# Patient Record
Sex: FEMALE | Race: WHITE
[De-identification: names, ages, dates, MRNs, and addresses within clinical notes are randomized per-mention and may not be internally consistent; named-entity substitution may affect disease eponyms.]

---

## 2021-01-29 ENCOUNTER — HOSPITAL ENCOUNTER (OUTPATIENT)
Dept: HOSPITAL 53 - M LDO | Age: 26
LOS: 1 days | Discharge: HOME | End: 2021-01-30
Attending: OBSTETRICS & GYNECOLOGY
Payer: COMMERCIAL

## 2021-01-29 VITALS — HEIGHT: 63 IN | WEIGHT: 184.97 LBS | BODY MASS INDEX: 32.77 KG/M2

## 2021-01-29 VITALS — DIASTOLIC BLOOD PRESSURE: 69 MMHG | SYSTOLIC BLOOD PRESSURE: 119 MMHG
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
## 2021-01-29 DIAGNOSIS — Z3A.32: ICD-10-CM

## 2021-01-29 DIAGNOSIS — O99.891: Primary | ICD-10-CM

## 2021-01-29 PROCEDURE — 85027 COMPLETE CBC AUTOMATED: CPT

## 2021-01-29 PROCEDURE — 85460 HEMOGLOBIN FETAL: CPT

## 2021-01-29 PROCEDURE — 59025 FETAL NON-STRESS TEST: CPT

## 2021-01-29 PROCEDURE — 36415 COLL VENOUS BLD VENIPUNCTURE: CPT

## 2021-01-30 LAB
HCT VFR BLD AUTO: 31.7 % (ref 36–47)
HGB BLD-MCNC: 10 G/DL (ref 12–15.5)
MCH RBC QN AUTO: 28.5 PG (ref 27–33)
MCHC RBC AUTO-ENTMCNC: 31.5 G/DL (ref 32–36.5)
MCV RBC AUTO: 90.3 FL (ref 80–96)
PLATELET # BLD AUTO: 202 10^3/UL (ref 150–450)
RBC # BLD AUTO: 3.51 10^6/UL (ref 4–5.4)
WBC # BLD AUTO: 10 10^3/UL (ref 4–10)
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
## 2021-03-19 ENCOUNTER — HOSPITAL ENCOUNTER (INPATIENT)
Dept: HOSPITAL 53 - M LDO | Age: 26
LOS: 2 days | Discharge: HOME | End: 2021-03-21
Attending: OBSTETRICS & GYNECOLOGY | Admitting: OBSTETRICS & GYNECOLOGY
Payer: COMMERCIAL

## 2021-03-19 VITALS — SYSTOLIC BLOOD PRESSURE: 134 MMHG | DIASTOLIC BLOOD PRESSURE: 62 MMHG

## 2021-03-19 VITALS — DIASTOLIC BLOOD PRESSURE: 63 MMHG | SYSTOLIC BLOOD PRESSURE: 138 MMHG

## 2021-03-19 VITALS — DIASTOLIC BLOOD PRESSURE: 56 MMHG | SYSTOLIC BLOOD PRESSURE: 120 MMHG

## 2021-03-19 VITALS — SYSTOLIC BLOOD PRESSURE: 128 MMHG | DIASTOLIC BLOOD PRESSURE: 58 MMHG

## 2021-03-19 VITALS — DIASTOLIC BLOOD PRESSURE: 57 MMHG | SYSTOLIC BLOOD PRESSURE: 129 MMHG

## 2021-03-19 VITALS — DIASTOLIC BLOOD PRESSURE: 54 MMHG | SYSTOLIC BLOOD PRESSURE: 110 MMHG

## 2021-03-19 VITALS — DIASTOLIC BLOOD PRESSURE: 63 MMHG | SYSTOLIC BLOOD PRESSURE: 139 MMHG

## 2021-03-19 VITALS — SYSTOLIC BLOOD PRESSURE: 123 MMHG | DIASTOLIC BLOOD PRESSURE: 55 MMHG

## 2021-03-19 VITALS — DIASTOLIC BLOOD PRESSURE: 60 MMHG | SYSTOLIC BLOOD PRESSURE: 112 MMHG

## 2021-03-19 VITALS — SYSTOLIC BLOOD PRESSURE: 106 MMHG | DIASTOLIC BLOOD PRESSURE: 54 MMHG

## 2021-03-19 VITALS — DIASTOLIC BLOOD PRESSURE: 87 MMHG | SYSTOLIC BLOOD PRESSURE: 118 MMHG

## 2021-03-19 VITALS — SYSTOLIC BLOOD PRESSURE: 116 MMHG | DIASTOLIC BLOOD PRESSURE: 67 MMHG

## 2021-03-19 VITALS — SYSTOLIC BLOOD PRESSURE: 132 MMHG | DIASTOLIC BLOOD PRESSURE: 62 MMHG

## 2021-03-19 VITALS — BODY MASS INDEX: 34.8 KG/M2 | HEIGHT: 63 IN | WEIGHT: 196.43 LBS

## 2021-03-19 VITALS — DIASTOLIC BLOOD PRESSURE: 59 MMHG | SYSTOLIC BLOOD PRESSURE: 129 MMHG

## 2021-03-19 VITALS — SYSTOLIC BLOOD PRESSURE: 130 MMHG | DIASTOLIC BLOOD PRESSURE: 60 MMHG

## 2021-03-19 VITALS — DIASTOLIC BLOOD PRESSURE: 65 MMHG | SYSTOLIC BLOOD PRESSURE: 133 MMHG

## 2021-03-19 VITALS — SYSTOLIC BLOOD PRESSURE: 114 MMHG | DIASTOLIC BLOOD PRESSURE: 51 MMHG

## 2021-03-19 VITALS — DIASTOLIC BLOOD PRESSURE: 79 MMHG | SYSTOLIC BLOOD PRESSURE: 130 MMHG

## 2021-03-19 VITALS — SYSTOLIC BLOOD PRESSURE: 136 MMHG | DIASTOLIC BLOOD PRESSURE: 63 MMHG

## 2021-03-19 VITALS — SYSTOLIC BLOOD PRESSURE: 131 MMHG | DIASTOLIC BLOOD PRESSURE: 77 MMHG

## 2021-03-19 DIAGNOSIS — Z3A.39: ICD-10-CM

## 2021-03-19 LAB
BASE EXCESS BLDCOA CALC-SCNC: -4.7 MMOL/L
BASE EXCESS BLDCOV CALC-SCNC: -5.9 MMOL/L
CO2 BLDCOA CALC-SCNC: 25.5 MEQ/L
CO2 BLDCOV CALC-SCNC: 20 MEQ/L
HCO3 STD BLDCOA-SCNC: 19.5 MEQ/L
HCO3 STD BLDCOA-SCNC: 23.8 MEQ/L
HCO3 STD BLDCOV-SCNC: 18.9 MEQ/L
HCO3 STD BLDCOV-SCNC: 19.5 MEQ/L
HCT VFR BLD AUTO: 34.5 % (ref 36–47)
HGB BLD-MCNC: 11.2 G/DL (ref 12–15.5)
MCH RBC QN AUTO: 27.7 PG (ref 27–33)
MCHC RBC AUTO-ENTMCNC: 32.5 G/DL (ref 32–36.5)
MCV RBC AUTO: 85.2 FL (ref 80–96)
PCO2 BLDCOA: 57.4 MMHG
PCO2 BLDCOV: 35.3 MMHG
PH BLDCOA: 7.24 UNITS
PH BLDCOV: 7.35 UNITS
PLATELET # BLD AUTO: 223 10^3/UL (ref 150–450)
PO2 BLDCOA: 25.8 MMHG
PO2 BLDCOV: 45.7 MMHG
RBC # BLD AUTO: 4.05 10^6/UL (ref 4–5.4)
SAO2 % BLDCOA: 54.3 %
SAO2 % BLDCOV: 88.4 %
WBC # BLD AUTO: 9.2 10^3/UL (ref 4–10)

## 2021-03-19 PROCEDURE — 0DQP0ZZ REPAIR RECTUM, OPEN APPROACH: ICD-10-PCS | Performed by: OBSTETRICS & GYNECOLOGY

## 2021-03-19 RX ADMIN — Medication PRN MG: at 10:59

## 2021-03-19 RX ADMIN — Medication PRN MG: at 11:05

## 2021-03-19 RX ADMIN — DOCUSATE SODIUM PRN MG: 100 CAPSULE, LIQUID FILLED ORAL at 19:31

## 2021-03-19 NOTE — ROOPDOC
[facility] Report Of Operation


Report of Operation


DATE OF PROCEDURE: 3/19/21





PREPROCEDURE DIAGNOSES: FOURTH DEGREE TEAR  WITH PRECIPITATE DELIVERY 





POSTPROCEDURE DIAGNOSES: SAME





PROCEDURE: REPAIR OF FOURTH DEGREE AND VAZQUEZ URETHRA AND PERICLITORAL LACERATION 







SURGEON: KENZIE FOLEY 





ASSISTANT: NONE 





ANESTHESIA: EPIDURAL 





ESTIMATED BLOOD LOSS: Approximately 200 ML  





COMPLICATIONS: NONE  





REMARKS: .





PROCEDURE NOTE: AFTER ADEQUATE EPIDURAL EVALUATION  OF TEAR REVEALED  VAGINAL 

MID LINE  LINEAR TEAR  UP 2/3 RD VAGINA  ALSO SPHINCTER TEAR  WITH MUSCLE AND 

CAPSULE TEAR . ALSO VAZQUEZ URETHERAL AND CLITORAL TEAR .


 





DESCRIPTION OF PROCEDURE:  WITH ADEQUATE VISUALIZATION TOP OF VAGINAL TEAR 

PLACED APEX SUTURE  3-0 THEN CONTINUOUS SUTURE TO FOURCHETTE. IDENTIFIED  MUSCLE

 APPROXIMATED TO MID LINE THEN CAPSULE REPAIRED WITH 2-0 VICRYL  THEN FINISHED  

VAGINAL REPAIR . DIGITAL EXAMINATION SPHINCTER COMPLETE TIGHT  NO DEFECT TO 

RECTAL MUCOSA . CHANGE GLOVES AND REPAIRED CLITORAL LACERATION AND PERIURETERAL 

AREA  WITH 2-0 WITH GOOD HEMOSTASIS . FINAL EXAMINATION LATERAL COTO AND CERVIX

INTACT











Isra Ledbetter MD             Mar 19, 2021 12:09

## 2021-03-19 NOTE — IPNPDOC
Text Note


Date of Service


The patient was seen on 3/19/21.





NOTE


03/19/21 26 yo g1 Po LMP 06/15/20 EDC 03/22/21 at 39.5  active labor  9 cm with 

bulging membranes in distress. 


           plan iv access epidural if time permits





VS,Fishbone, I+O


VS, Fishbone, I+O


Laboratory Tests


3/19/21 07:10











Vital Signs








  Date Time  Temp Pulse Resp B/P (MAP) Pulse Ox O2 Delivery O2 Flow Rate FiO2


 


3/19/21 07:24 98.2       


 


3/19/21 07:18  93  136/63 (87)    

















Isra Ledbetter MD             Mar 19, 2021 07:43

## 2021-03-19 NOTE — DNPDOC
[facility] Delivery Note


Delivery Note











Item Value  Date Time


 


White Blood Count 9.2 10^3/uL 3/19/21 0710


 


Red Blood Count 4.05 10^6/uL 3/19/21 0710


 


Hemoglobin 11.2 g/dl L 3/19/21 0710


 


Hematocrit 34.5 % L 3/19/21 0710


 


Mean Corpuscular Volume 85.2 fl 3/19/21 0710


 


Mean Corpuscular Hemoglobin 27.7 pg 3/19/21 0710


 


Mean Corpuscular Hemoglobin Concent 32.5 g/dl 3/19/21 0710


 


Red Cell Distribution Width 14.0 % 3/19/21 0710


 


Platelet Count 223 10^3/uL 3/19/21 0710








DATE OF DELIVERY: 2021 





PREDELIVERY DIAGNOSIS: 39.5 weeks' gestation and labor. 





POST DELIVERY DIAGNOSIS: Delivered.





PROCEDURE: [Spontaneous PRECIPITATE DELIVERY IN CAUL 





OBSTETRICIAN: Dr. EUNICE ESPINO 





ANESTHESIA: EPIDURAL POST DELIVERY FOR REPAIR 





ESTIMATED BLOOD LOSS: 800  mL.





FINDINGS: 7 pound 0 ounce FEMALE  Apgar Score 9/9 , nuchal cord times 0 BORN IN 

CAUL MECONIUM 





DELIVERY SUMMARY: Patient is a 25 -year-old  1 now para 1  who was 

admitted to labor and delivery ACTIVE  LABOR  FULLY DILATED    PRECIPITATE 

UNCONTROLLED DELIVERY FEMALE. IN CAUL WITH MECONIUM SUSTAINED 4 TH DEGREE VAZQUEZ 

URETERAL AND VAZQUEZ CLITORAL TEAR REQUIRE EPIDURAL FOR REPAIR. ATONIC UTERUS  NEED

 FOR CYTOTEC   AND IV PITOCIN ALFARO CATHETER IN AWAITING  ANESTHESIA











Isra Espino MD             Mar 19, 2021 08:29

## 2021-03-19 NOTE — HPEPDOC
Obstetrical History & Physical


General


Date of Admission


Mar 19, 2021 at 06:57


Primary Care Physician:  Isra Ledbetter MD





History of Present Illness


2021 ADMITTED IN ACTIVE LABOR FULLY DILATED AND INVOLUNTARY PUSHING 

IMMANENT DELIVERY


Chief Complaint:  Contractions, term


Information Provided By:  Patient


Age:  25


:  1


Term:  1


Pre-term:  0


Abortions:  0


Livin





Prenatal Care


Prenatal Care:  [facility]


Number of Prenatal Visits:  9





Prenatal Dating


Final EDC:  Mar 22, 2021


Final EDC by:  LMP


LMP:  Markos 15, 2020


1st Trimester Date:  Aug 31, 2020


Weeks + Days:  11.0


Estimated Date of Confinement:  Mar 22, 2021


EGA at Admission:  39.5





Antepartum Course


Prenatal Diagnos(e)s


ACTIVE LABOR AT TERM


Height (inches):  53


Pre-Pregnancy weight (lbs.):  160


Admission Weight (lbs.):  195


Change in Weight (lbs.):  35





Past Medical History


Past Obstetrical History :  


   Sex of Infant:  Female


GYN History:  No pertinent history


Past Medical History


Surgical History:  Denies/None





Family History


Significant Family History:  No pertinent family hx





Social History


Social history


NON SMOKER NO ETOH NO RECREATIONAL DRUGS NO VAPING  TO AD NO VIOLENCE


Marital Status:  


Family situation:  Spouse/partner home


Psychosocial History:  No pertinent psych hx


* Smoker:  non-smoker


Alcohol:  Denies


Drugs:  denies





Abuse Violence Screening


Have you been hit/kicked/slapp:  No


Have you been sexually assault:  No





Prenatal Imunizations


Tdap status:  current


Influenza Status:  current





Allergies


Coded Allergies:  


     No Known Allergies (Unverified , 21)





Medications


Scheduled


Prenatal No.137/Iron/Folic Acd (Prenatal Vitamin Tablet) 1 Each Tablet, 1 TAB PO

DAILY





Physical Examination


Physical Examination


GENERAL: Alert and oriented times three.


BREAST: .


ABDOMEN: Gravid and non-tender to touch.


FETUS: Is vertex (VTX) by sterile vaginal examination (SVE), fetus is vertex 

(VTX) by Leopold.


HEART RATE: Regular rate and rhythm.


LUNGS: Clear to auscultation (CTA).


EXTREMITIES: No edema. No clonus. Deep tendon reflexes (DTRs) + .


Other physical findings


STRIAE  NO JVD NO BRUITS  NO DVT NO SOB





Vital Signs/I&O





Vital Signs








  Date Time  Temp Pulse Resp B/P (MAP) Pulse Ox O2 Delivery O2 Flow Rate FiO2


 


3/19/21 07:24 98.2       


 


3/19/21 07:18  93  136/63 (87)    











Laboratory Data


24H LABS


Laboratory Tests 2


3/19/21 07:10: Nucleated Red Blood Cells % (auto) 0.0


3/19/21 07:11: Serology Scanned Report Hepatitis B Testing


3/19/21 08:18: 


Cord Arterial Blood pH 7.235, Cord Arterial Blood PCO2 57.4, Cord Arterial Blood

PO2 25.8, Cord Arterial Blood HCO3 23.8, Cord Arterial Blood Total CO2 25.5, 

Cord Arterial Blood Base Excess -4.7, Cord Arterial Base Excess (Standard 19.5, 

Cord Arterial Bld Oxygen Saturation 54.3, Cord Venous Blood pH 7.346, Cord 

Venous Blood PCO2 35.3, Cord Venous Blood PO2 45.7, Cord Venous Blood HCO3 18.9,

Cord Venous Blood Total CO2 20.0, Cord Venous Base Excess (Actual) -5.9, Cord 

Venous Base Excess (Standard) 19.5, Cord Venous Blood Oxygen Saturation 88.4


CBC/BMP


Laboratory Tests


3/19/21 07:10








Microbiology


FULLY DILATED INVOLUNTARY PUSHING





Pertinent Laboratoy Data


Blood Type:  A+


RBC Antibody Screen:  Negative


HIV:  Negative


Hepatitis B:  Negative


Rapid Plasma Reagin:  Nonreactive


Rubella:  Immune


Varicella:  Immune


Chlamydia/Gonorrhea:  Negative


Group B Streptococcus:  Negative


Cystic Fibrosis:  Negative





Anatomy Ultrasound


Placenta Location:  Anterior


Normal Anatomy:  Yes


Placenta Previa:  No





 Steroid Therapy


 Steroid Therapy:  No





Vaginal Examination


Dilation:  complete


Effacement:  100%


Station:  +2


Cervical Consistency:  Soft


Cervical Position:  Anterior


Fetal Presentation:  Cephalic presentation


Fetal Position:  Vertex (occiput)





Fetal Assessment


Variability:  Moderate


Accelerations:  Present


Decelerations:  None





Tocometer


Contractions:  Yes


Frequency:  regular


Duration:  greater than 60 seconds


Strength:  palpated as strong





Assessment/Plan


Assessment


 25-year-old  (G)[1 para (P0 at 39.5 weeks by 11week ultrasound. 

Presents to Labor and Delivery (L&D) ACTIVE LABOR PRECIPITATE DELIVERY





Plan


Admit and orient.


 and consent.


Diet: NPO


Group B Streptococcus (GBS) [negative].


Labs and intravenous (IV) per unit protocol.


Counseled on Pitocin POST PARTUM .


Lactated Ringers (LR): Bolus 1000 mL, then at 125 mL/hr.


Anticipate [normal spontaneous delivery (NSD





Labor and Delivery Counseling


PLAN  FOR IMMANENT DELIVERY











Isra Ledbetter MD             Mar 19, 2021 08:55

## 2021-03-20 VITALS — SYSTOLIC BLOOD PRESSURE: 108 MMHG | DIASTOLIC BLOOD PRESSURE: 56 MMHG

## 2021-03-20 VITALS — SYSTOLIC BLOOD PRESSURE: 128 MMHG | DIASTOLIC BLOOD PRESSURE: 81 MMHG

## 2021-03-20 LAB
HCT VFR BLD AUTO: 28.8 % (ref 36–47)
HGB BLD-MCNC: 9.3 G/DL (ref 12–15.5)
MCH RBC QN AUTO: 27.8 PG (ref 27–33)
MCHC RBC AUTO-ENTMCNC: 32.3 G/DL (ref 32–36.5)
MCV RBC AUTO: 86.2 FL (ref 80–96)
PLATELET # BLD AUTO: 192 10^3/UL (ref 150–450)
RBC # BLD AUTO: 3.34 10^6/UL (ref 4–5.4)
WBC # BLD AUTO: 10.5 10^3/UL (ref 4–10)

## 2021-03-20 RX ADMIN — IBUPROFEN PRN MG: 600 TABLET, FILM COATED ORAL at 19:40

## 2021-03-20 RX ADMIN — Medication SCH TAB: at 07:41

## 2021-03-20 RX ADMIN — IBUPROFEN PRN MG: 600 TABLET, FILM COATED ORAL at 07:41

## 2021-03-20 RX ADMIN — DOCUSATE SODIUM PRN MG: 100 CAPSULE, LIQUID FILLED ORAL at 19:40

## 2021-03-20 NOTE — IPN
PROGRESS NOTE



DATE:  2021



SUBJECTIVE: This lady is a 25-year-old  1, now para 1, was admitted at

39 and 6 weeks of gestation in active labor, precipitously delivered livebirth

female infant 7 pounds 0 ounces, 3180 gm, Apgars 9 and 9 at one and five

minutes respectively. Arterial pH 7.23, base excess -4.7, venous pH 7.34, base

excess -5.9.



Unfortunately, with a precipitous delivery and uncontrolled she sustained a 4th

degree tear, had a postpartum hemorrhage, atonic uterus requiring Cytotec and

Pitocin, epidural to repair her 4th degree.



On her first postpartum day we discussed phlebitis, cystitis, mastitis,

endometritis, cellulitis, diet, exercise, pain management, perineal care. She

will have ongoing Cimetidine as well as Mag-Oxide as well as Colace in order to

ensure she has a soft bowel movement.



PHYSICAL EXAMINATION:  Blood pressure 108/56, respirations 16, pulse is 94,

temperature is 97.6. Kulkarni catheter is removed and she is voiding well. Her

admitting hemoglobin was 11.2, hematocrit 34.5 and platelets are 223.

Postpartum day 1 hemoglobin is still pending. The rest of the examination is

unremarkable. Normocephalic, atraumatic. Neck full range of motion. Pupils

equal and reactive to light. Distal pulses symmetric. No evidence of DVT, PE or

superficial phlebitis. Chest is clear bilaterally to bases, no wheezing or

rhonchi. No CVA tenderness. Abdomen soft, four quadrant bowel sounds are noted.

Uterus 2 below. She is presently breast feeding. She is voiding. She is

walking, passing gas.



PLAN:  For discharge tomorrow with appointment for two week postpartum check of

her perineum and 6 week postpartum check both at [facility]. 



Medications were dispensed at [place]. All questions were answered. 20 minute

discussion.











cc: [facility]

## 2021-03-21 VITALS — DIASTOLIC BLOOD PRESSURE: 65 MMHG | SYSTOLIC BLOOD PRESSURE: 112 MMHG

## 2021-03-21 RX ADMIN — Medication SCH TAB: at 08:08

## 2021-03-21 RX ADMIN — IBUPROFEN PRN MG: 600 TABLET, FILM COATED ORAL at 13:46

## 2021-03-21 NOTE — OBDS
[facility] Obstetrical Discharge Sum.


Obstetrical Discharge Summary


Date:  Mar 21, 2021


Time:  08:30


:  1


Term:  1


Pre-term:  0


Abortions:  0


Livin


Labor


Precipitous labor


Delivery


 with 4MLL and periclitoral laceration repaired postpartum


Infant Sex:  Female


Anesthesia:  Regional Anesthesia


Episiotomy


fourth degree midline and periclitoral repaired, well approximated with small 

edema and lochia





A/P, Post Partum Course


List any complications


Admission diagnosis: active labor.


Discharge diagnosis: day 2 postpartum


Condition at Discharge: stable


Discharge Instructions: Home


Activity: OOB ad marlene


Diet: regular


Medications: @[facility]


Follow-up: for lactation on 2021


Other: Reviewed lactation, PP BCM with micronor, signs of depression, reasons to

return for care, and [employer] steps to soothe a baby.  

Schedule 6wk EVA ZABALA CNM       Mar 21, 2021 08:51